# Patient Record
Sex: MALE | URBAN - METROPOLITAN AREA
[De-identification: names, ages, dates, MRNs, and addresses within clinical notes are randomized per-mention and may not be internally consistent; named-entity substitution may affect disease eponyms.]

---

## 2021-09-22 ENCOUNTER — OFFICE VISIT (OUTPATIENT)
Dept: URBAN - METROPOLITAN AREA CLINIC 37 | Facility: CLINIC | Age: 56
End: 2021-09-22
Payer: COMMERCIAL

## 2021-09-22 DIAGNOSIS — H35.40 UNSPECIFIED PERIPHERAL RETINAL DEGENERATION: ICD-10-CM

## 2021-09-22 DIAGNOSIS — Z96.1 PRESENCE OF INTRAOCULAR LENS: ICD-10-CM

## 2021-09-22 DIAGNOSIS — S05.02XA CORNEAL ABRASION W/O FB OF LT EYE, INITIAL ENCOUNTER: Primary | ICD-10-CM

## 2021-09-22 PROCEDURE — 99204 OFFICE O/P NEW MOD 45 MIN: CPT | Performed by: OPTOMETRIST

## 2021-09-22 ASSESSMENT — VISUAL ACUITY
OD: 20/20
OS: 20/20

## 2021-09-22 ASSESSMENT — INTRAOCULAR PRESSURE
OD: 19
OS: 15

## 2021-09-22 NOTE — IMPRESSION/PLAN
Impression: Unspecified peripheral retinal degeneration: H35.40.  Plan: signs and symptoms of RD explained, RTC immediately

## 2023-08-28 ENCOUNTER — OFFICE VISIT (OUTPATIENT)
Dept: URBAN - METROPOLITAN AREA CLINIC 30 | Facility: CLINIC | Age: 58
End: 2023-08-28
Payer: COMMERCIAL

## 2023-08-28 DIAGNOSIS — H52.13 MYOPIA, BILATERAL: Primary | ICD-10-CM

## 2023-08-28 DIAGNOSIS — Z98.890 OTHER SPECIFIED POSTPROCEDURAL STATES: ICD-10-CM

## 2023-08-28 PROCEDURE — 92014 COMPRE OPH EXAM EST PT 1/>: CPT

## 2023-08-28 ASSESSMENT — KERATOMETRY
OD: 42.99
OS: 43.30

## 2023-08-28 ASSESSMENT — VISUAL ACUITY
OS: 20/20
OD: 20/25

## 2023-08-28 ASSESSMENT — INTRAOCULAR PRESSURE
OS: 14
OD: 15